# Patient Record
Sex: FEMALE | NOT HISPANIC OR LATINO | Employment: UNEMPLOYED | URBAN - METROPOLITAN AREA
[De-identification: names, ages, dates, MRNs, and addresses within clinical notes are randomized per-mention and may not be internally consistent; named-entity substitution may affect disease eponyms.]

---

## 2024-12-23 ENCOUNTER — APPOINTMENT (OUTPATIENT)
Dept: OBGYN | Facility: CLINIC | Age: 19
End: 2024-12-23
Payer: COMMERCIAL

## 2024-12-23 ENCOUNTER — HOSPITAL ENCOUNTER (OUTPATIENT)
Facility: MEDICAL CENTER | Age: 19
End: 2024-12-23
Attending: STUDENT IN AN ORGANIZED HEALTH CARE EDUCATION/TRAINING PROGRAM
Payer: COMMERCIAL

## 2024-12-23 VITALS
WEIGHT: 102 LBS | HEIGHT: 62 IN | SYSTOLIC BLOOD PRESSURE: 114 MMHG | HEART RATE: 93 BPM | BODY MASS INDEX: 18.77 KG/M2 | DIASTOLIC BLOOD PRESSURE: 62 MMHG

## 2024-12-23 DIAGNOSIS — N96 HISTORY OF RECURRENT MISCARRIAGES: ICD-10-CM

## 2024-12-23 DIAGNOSIS — N89.8 VAGINAL DISCHARGE: ICD-10-CM

## 2024-12-23 LAB
CANDIDA DNA VAG QL PROBE+SIG AMP: NEGATIVE
G VAGINALIS DNA VAG QL PROBE+SIG AMP: NEGATIVE
T VAGINALIS DNA VAG QL PROBE+SIG AMP: NEGATIVE

## 2024-12-23 PROCEDURE — 3074F SYST BP LT 130 MM HG: CPT | Performed by: STUDENT IN AN ORGANIZED HEALTH CARE EDUCATION/TRAINING PROGRAM

## 2024-12-23 PROCEDURE — 87660 TRICHOMONAS VAGIN DIR PROBE: CPT

## 2024-12-23 PROCEDURE — 87480 CANDIDA DNA DIR PROBE: CPT

## 2024-12-23 PROCEDURE — 99204 OFFICE O/P NEW MOD 45 MIN: CPT | Performed by: STUDENT IN AN ORGANIZED HEALTH CARE EDUCATION/TRAINING PROGRAM

## 2024-12-23 PROCEDURE — 3078F DIAST BP <80 MM HG: CPT | Performed by: STUDENT IN AN ORGANIZED HEALTH CARE EDUCATION/TRAINING PROGRAM

## 2024-12-23 PROCEDURE — 87510 GARDNER VAG DNA DIR PROBE: CPT

## 2024-12-23 NOTE — PROGRESS NOTES
Patient here as new patient looking to establish and consult for family planning. Pt states she has had previous miscarriages and would like to discuss those.  LMP: 12/7/24  BCM: none  Not taking PNV  Last Pap: [facility] 03/09/23 WNL  Phone number/Pharmacy verified: [phone] (home)

## 2024-12-23 NOTE — PROGRESS NOTES
[facility]  GYNECOLOGY VISIT - INFERTILITY    Chief Complaint  New Patient (Family planning)      Subjective     Subjective  Eli Norman is a 19 y.o. 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 She's been trying to conceive for over one year and has had four miscarriages, the furtherest along has been 8wks. She's never had an US diangosed pregnancy, just noted on positive UPT. Her menses are regular every month with mneses lasting 7-8 days. She uses 5-6 pads on her heaviest days.     Patient History  Contraception history: never  History of PCOS, thyroid abnormalities, etc: no   Family history of birth defects, developmental delay, early menopause, or reproductive problems: no  Use of nicotine, alcohol, drugs? Hx of smoking for five year, stopped one year ago. Smkes MJ every day for appetitie stinuatlition.   Prior work-up for infertility: no  Hirsuitism? No  Acne? No  Diabetes or glucose intolerance? no  Obesity? No  Breast discharge? No  Occupational exposure to hazardous materials? No. Works at [employer].     Partner History  Partner's name: Ted   Partner's age: 26  Has partner ever fathered children? none  Any pertinent medical or surgical history for partner? none  Any problems with erection/ejaculation? none  Semen analysis performed already? none  Family history of birth defects, developmental delay, early menopause, or reproductive problems: no  Medication use: no  Use of nicotine, alcohol, drugs? Prior cigarette smoker, not stopped. Smokes MJ daily.  Current or prior anabolic steroids or supplements: no      Gynecology History and ROS  Current Sexual Activity: Yes  History of sexually transmitted diseases?  no  Abnormal discharge? No  Current Contraception:  none    Menstrual History  Patient's last menstrual period was 2024 (exact date).  Periods are regular  q month, lasting 7-8 days.   Clots or heavy flow: No  Dysmenorrhea: No  Intermenstrual  bleeding/spotting: No  Significant pain with periods:No  Bothersome PMS symptoms: No  Significant Pelvic Pain: No    Pap History  Last pap smear:  never  History of moderate or severe dysplasia: No      Obstetric History  OB History    Para Term  AB Living   4       4     SAB IAB Ectopic Molar Multiple Live Births   4                # Outcome Date GA Lbr Juan David/2nd Weight Sex Type Anes PTL Lv   4 SAB 10/02/24 5w0d    SAB         Birth Comments: passed on its own   3 24 7w0d    SAB         Birth Comments: passed on its own   2 23 5w0d    SAB         Birth Comments: passed on its own   1 2023 8w0d    SAB         Birth Comments: passed on its own       Past Medical History  History reviewed. No pertinent past medical history.    Past Surgical History  History reviewed. No pertinent surgical history.    Social History  Social History     Socioeconomic History    Marital status:      Spouse name: Not on file    Number of children: Not on file    Years of education: Not on file    Highest education level: Not on file   Occupational History    Not on file   Tobacco Use    Smoking status: Former     Types: Cigarettes    Smokeless tobacco: Never   Vaping Use    Vaping status: Every Day    Substances: Nicotine, THC    Devices: Disposable   Substance and Sexual Activity    Alcohol use: Not Currently    Drug use: Yes     Types: Marijuana, Inhaled     Comment: THC    Sexual activity: Yes     Partners: Male     Birth control/protection: None   Other Topics Concern    Not on file   Social History Narrative    Not on file     Social Drivers of Health     Financial Resource Strain: Not on file   Food Insecurity: Not on file   Transportation Needs: Not on file   Physical Activity: Not on file   Stress: Not on file   Social Connections: Not on file   Intimate Partner Violence: Not on file   Housing Stability: Not on file       Family History  Family History   Problem Relation Age of Onset  "   Heart Disease Paternal Aunt     Breast Cancer Paternal Grandmother     Cancer Paternal Grandmother         blood cancer       Home Medications  No current outpatient medications on file prior to visit.     No current facility-administered medications on file prior to visit.       Allergies/Reactions  No Known Allergies    ROS  ROS    Objective     Physical Examination:  Vital Signs:   Vitals:    12/23/24 1335   BP: 114/62   BP Location: Left arm   Patient Position: Sitting   BP Cuff Size: Adult   Pulse: 93   Weight: 102 lb   Height: 5' 2\"     Body mass index is 18.66 kg/m².  Physical Exam  Constitutional:       General: She is not in acute distress.     Appearance: Normal appearance. She is normal weight.   Genitourinary:      No lesions in the vagina.      Right Labia: No rash, tenderness or lesions.     Left Labia: No tenderness, lesions or rash.     No labial fusion noted.      Vaginal discharge present.      No vaginal erythema or bleeding.        Right Adnexa: not tender, not full and no mass present.     Left Adnexa: not tender, not full and no mass present.     Cervix is nulliparous.      No cervical discharge or lesion.      Uterus is not enlarged or tender.      No uterine mass detected.  Eyes:      Conjunctiva/sclera: Conjunctivae normal.   Pulmonary:      Effort: Pulmonary effort is normal.   Abdominal:      General: Abdomen is flat. There is no distension.      Palpations: Abdomen is soft. There is no mass.      Tenderness: There is no abdominal tenderness.   Neurological:      General: No focal deficit present.      Mental Status: She is alert and oriented to person, place, and time.   Skin:     General: Skin is warm and dry.   Psychiatric:         Mood and Affect: Mood normal.         Behavior: Behavior normal.   Vitals and nursing note reviewed. Exam conducted with a chaperone present.         Assessment   Eli Norman is a 19 y.o. female who presents today for evaluation of recurretn pregnancy " loss.     #Recurrent Pregnancy Loss  - Discussed that miscarriages in the first trimester can be relatively common, sporadic events occurring in aprox 10-15% of pregnancies uner 20wks gestation. Given her history of four prior miscarriages, workup is reasonable.   - Recommended APAS testing, TSH, and uterine evaluation with TVUS.  - Discussed we many not find an etiology of her pregnancy loss, and that referral to [facility] may be warranted in the future.   - Recommended starting PNV    Return: 2-3 following TVUS and labs    Aylin Rose M.D.

## 2024-12-31 ENCOUNTER — HOSPITAL ENCOUNTER (OUTPATIENT)
Dept: LAB | Facility: MEDICAL CENTER | Age: 19
End: 2024-12-31
Attending: STUDENT IN AN ORGANIZED HEALTH CARE EDUCATION/TRAINING PROGRAM
Payer: COMMERCIAL

## 2024-12-31 LAB
T4 FREE SERPL-MCNC: 1.16 NG/DL (ref 0.93–1.7)
TSH SERPL-ACNC: 0.42 UIU/ML (ref 0.35–5.5)

## 2024-12-31 PROCEDURE — 84439 ASSAY OF FREE THYROXINE: CPT

## 2024-12-31 PROCEDURE — 86147 CARDIOLIPIN ANTIBODY EA IG: CPT

## 2024-12-31 PROCEDURE — 84443 ASSAY THYROID STIM HORMONE: CPT

## 2024-12-31 PROCEDURE — 36415 COLL VENOUS BLD VENIPUNCTURE: CPT

## 2025-01-03 LAB
CARDIOLIPIN IGG SER IA-ACNC: <10 GPL
CARDIOLIPIN IGM SER IA-ACNC: <10 MPL

## 2025-02-04 ENCOUNTER — HOSPITAL ENCOUNTER (OUTPATIENT)
Dept: RADIOLOGY | Facility: MEDICAL CENTER | Age: 20
End: 2025-02-04
Attending: STUDENT IN AN ORGANIZED HEALTH CARE EDUCATION/TRAINING PROGRAM
Payer: COMMERCIAL

## 2025-02-04 DIAGNOSIS — N96 HISTORY OF RECURRENT MISCARRIAGES: ICD-10-CM

## 2025-02-04 PROCEDURE — 76830 TRANSVAGINAL US NON-OB: CPT
